# Patient Record
Sex: MALE | Race: WHITE | NOT HISPANIC OR LATINO | Employment: PART TIME | URBAN - METROPOLITAN AREA
[De-identification: names, ages, dates, MRNs, and addresses within clinical notes are randomized per-mention and may not be internally consistent; named-entity substitution may affect disease eponyms.]

---

## 2024-07-26 ENCOUNTER — HOSPITAL ENCOUNTER (EMERGENCY)
Facility: CLINIC | Age: 34
Discharge: HOME | End: 2024-07-26
Attending: FAMILY MEDICINE | Admitting: FAMILY MEDICINE

## 2024-07-26 VITALS
RESPIRATION RATE: 18 BRPM | WEIGHT: 156 LBS | BODY MASS INDEX: 24.48 KG/M2 | OXYGEN SATURATION: 99 % | DIASTOLIC BLOOD PRESSURE: 68 MMHG | SYSTOLIC BLOOD PRESSURE: 131 MMHG | HEART RATE: 66 BPM | HEIGHT: 67 IN

## 2024-07-26 DIAGNOSIS — S61.012A THUMB LACERATION, LEFT, INITIAL ENCOUNTER: ICD-10-CM

## 2024-07-26 PROCEDURE — 99282 EMERGENCY DEPT VISIT SF MDM: CPT | Mod: 25 | Performed by: FAMILY MEDICINE

## 2024-07-26 PROCEDURE — 90471 IMMUNIZATION ADMIN: CPT | Performed by: FAMILY MEDICINE

## 2024-07-26 PROCEDURE — 250N000011 HC RX IP 250 OP 636: Performed by: FAMILY MEDICINE

## 2024-07-26 PROCEDURE — 99283 EMERGENCY DEPT VISIT LOW MDM: CPT | Performed by: FAMILY MEDICINE

## 2024-07-26 PROCEDURE — 90715 TDAP VACCINE 7 YRS/> IM: CPT | Performed by: FAMILY MEDICINE

## 2024-07-26 RX ADMIN — CLOSTRIDIUM TETANI TOXOID ANTIGEN (FORMALDEHYDE INACTIVATED), CORYNEBACTERIUM DIPHTHERIAE TOXOID ANTIGEN (FORMALDEHYDE INACTIVATED), BORDETELLA PERTUSSIS TOXOID ANTIGEN (GLUTARALDEHYDE INACTIVATED), BORDETELLA PERTUSSIS FILAMENTOUS HEMAGGLUTININ ANTIGEN (FORMALDEHYDE INACTIVATED), BORDETELLA PERTUSSIS PERTACTIN ANTIGEN, AND BORDETELLA PERTUSSIS FIMBRIAE 2/3 ANTIGEN 0.5 ML: 5; 2; 2.5; 5; 3; 5 INJECTION, SUSPENSION INTRAMUSCULAR at 01:27

## 2024-07-26 ASSESSMENT — ACTIVITIES OF DAILY LIVING (ADL): ADLS_ACUITY_SCORE: 35

## 2024-07-26 NOTE — ED PROVIDER NOTES
ED Provider Note     Chevy Saucedo  [id number]  July 26, 2024      CC:     Chief Complaint   Patient presents with    Laceration          History is obtained from the patient.    HPI: Chevy Saucedo is a 33 year old male presenting with minor laceration to the left thumb.  Patient was using a knife, and he accidentally poked/cut himself with a knife.  Bleeding is controlled with pressure.  Injury occurred just prior to arrival.  Last tetanus shot was over 10 years ago.  Patient works as a  and washes hands frequently.        PMH/Problem List:   Past Medical History:   Diagnosis Date    ADHD (attention deficit hyperactivity disorder)     Asthma     Depressive disorder     Substance abuse (H)        PSH: No past surgical history on file.    MEDS:   Current Facility-Administered Medications   Medication Dose Route Frequency Provider Last Rate Last Admin    Tdap (tetanus-diphtheria-acell pertussis) (ADACEL) injection 0.5 mL  0.5 mL Intramuscular Once Carson, Shonna Metcalf MD         Current Outpatient Medications   Medication Sig Dispense Refill    gabapentin (NEURONTIN) 300 MG tablet Take 3 tablets by mouth 3 times daily as needed (For anxiety. May alternate with Seroquel). 270 tablet 0    hydrOXYzine (ATARAX) 50 MG tablet Take 1 tablet by mouth 2 times daily as needed for anxiety. 60 tablet 0    Methylphenidate HCl (RITALIN LA) 40 MG CP24 Take  by mouth.      quetiapine (SEROQUEL) 200 MG tablet Take 1 tablet by mouth At Bedtime. 30 tablet 0    quetiapine (SEROQUEL) 25 MG tablet Take 1-2 tablets by mouth 3 times daily as needed (anxiety). 90 tablet 0    sertraline (ZOLOFT) 50 MG tablet Take 3 tablets by mouth daily. 90 tablet 0    TRAZodone (DESYREL) 50 MG tablet Take 1 tablet by mouth At Bedtime. 60 tablet 0       Allergies: Patient has no known allergies.    Triage and nursing notes were reviewed.      Physical Exam:  Vitals:    07/26/24 0113   Pulse: (P) 66    Resp: (P) 18   Temp: (P) 98.2  F (36.8  C)   TempSrc: (P) Temporal   SpO2: (P) 99%     GENERAL APPEARANCE: Alert, no distress  RESP: Normal respiratory effort  EXT: 5 mm laceration to the left thumb.  Minimal bleeding.  SKIN: Minor laceration to the left thumb measuring approximately 5 mm      Labs/Imaging Results:  No results found for this or any previous visit (from the past 24 hour(s)).        Impression:  Final diagnoses:   Thumb laceration, left, initial encounter         ED Course & Medical Decision Making (Plan):  Chevy Saucedo is a 33 year old male with minor laceration to left thumb.  There was very minimal bleeding.  Patient's laceration will close by secondary intention.  Keep the wound clean.  Apply dressing.  Patient's tetanus was updated.    Written after-visit summary and instructions were given at the time of discharge.          Discharge Instructions:  You have a minor laceration to the left thumb.  This should heal within the next 7-10 days.  Keep the area clean.  Apply a bandage as needed.  You received your tetanus booster.  Recheck as needed.        Disclaimer: This note consists of words and symbols derived from keyboarding and dictation using voice recognition software.  As a result, there may be errors that have gone undetected.  Please consider this when interpreting information found in this note.       Shonna Byrd MD  07/26/24 0126

## 2024-07-26 NOTE — DISCHARGE INSTRUCTIONS
You have a minor laceration to the left thumb.  This should heal within the next 7-10 days.  Keep the area clean.  Apply a bandage as needed.  You received your tetanus booster.  Recheck as needed.

## 2024-07-26 NOTE — ED TRIAGE NOTES
Left thumb lac 30 minutes ago while cleaning his knife. Bleeding controlled via paper towel dressing applied PTA.     Triage Assessment (Adult)       Row Name 07/26/24 0112          Triage Assessment    Airway WDL WDL        Respiratory WDL    Respiratory WDL WDL        Skin Circulation/Temperature WDL    Skin Circulation/Temperature WDL WDL        Cardiac WDL    Cardiac WDL WDL        Peripheral/Neurovascular WDL    Peripheral Neurovascular WDL WDL        Cognitive/Neuro/Behavioral WDL    Cognitive/Neuro/Behavioral WDL WDL